# Patient Record
Sex: FEMALE | Race: WHITE
[De-identification: names, ages, dates, MRNs, and addresses within clinical notes are randomized per-mention and may not be internally consistent; named-entity substitution may affect disease eponyms.]

---

## 2017-07-10 ENCOUNTER — HOSPITAL ENCOUNTER (EMERGENCY)
Dept: HOSPITAL 72 - EMR | Age: 47
Discharge: HOME | End: 2017-07-10
Payer: COMMERCIAL

## 2017-07-10 VITALS — SYSTOLIC BLOOD PRESSURE: 125 MMHG | DIASTOLIC BLOOD PRESSURE: 54 MMHG

## 2017-07-10 VITALS — WEIGHT: 130 LBS | BODY MASS INDEX: 25.52 KG/M2 | HEIGHT: 60 IN

## 2017-07-10 VITALS — SYSTOLIC BLOOD PRESSURE: 128 MMHG | DIASTOLIC BLOOD PRESSURE: 59 MMHG

## 2017-07-10 VITALS — SYSTOLIC BLOOD PRESSURE: 140 MMHG | DIASTOLIC BLOOD PRESSURE: 70 MMHG

## 2017-07-10 DIAGNOSIS — N83.202: ICD-10-CM

## 2017-07-10 DIAGNOSIS — K42.9: ICD-10-CM

## 2017-07-10 DIAGNOSIS — R10.32: Primary | ICD-10-CM

## 2017-07-10 LAB
ALBUMIN/GLOB SERPL: 1.6 {RATIO} (ref 1–2.7)
ALT SERPL-CCNC: 17 U/L (ref 3–33)
ANION GAP SERPL CALC-SCNC: 15 MMOL/L (ref 5–15)
APPEARANCE UR: CLEAR
APTT BLD: 26 SEC (ref 23–33)
AST SERPL-CCNC: 21 U/L (ref 5–40)
BASOPHILS NFR BLD AUTO: 1.3 % (ref 0–2)
CALCIUM SERPL-MCNC: 10 MG/DL (ref 8.6–10.2)
CHLORIDE SERPL-SCNC: 99 MEQ/L (ref 98–107)
CO2 SERPL-SCNC: 22 MEQ/L (ref 20–30)
CREAT SERPL-MCNC: 1 MG/DL (ref 0.5–0.9)
EOSINOPHIL NFR BLD AUTO: 0.4 % (ref 0–3)
ERYTHROCYTE [DISTWIDTH] IN BLOOD BY AUTOMATED COUNT: 12.7 % (ref 11.6–14.8)
GFR SERPLBLD BASED ON 1.73 SQ M-ARVRAT: 58.9 ML/MIN (ref 60–?)
GLOBULIN SER-MCNC: 3 G/DL
HEMOLYSIS: 7
INR PPP: 0.9 (ref 0.9–1.1)
KETONES UR QL STRIP: (no result)
LEUKOCYTE ESTERASE UR QL STRIP: NEGATIVE
LIPASE SERPL-CCNC: 64 U/L (ref ?–60)
LYMPHOCYTES NFR BLD AUTO: 9.4 % (ref 20–45)
MCH RBC QN AUTO: 31.5 PG (ref 27–31)
MCHC RBC AUTO-ENTMCNC: 32 G/DL (ref 32–36)
MCV RBC AUTO: 98 FL (ref 80–99)
MONOCYTES NFR BLD AUTO: 4.3 % (ref 1–10)
NEUTROPHILS NFR BLD AUTO: 84.7 % (ref 45–75)
NITRITE UR QL STRIP: NEGATIVE
PH UR STRIP: 5 [PH] (ref 4.5–8)
PLATELET # BLD: 404 K/UL (ref 150–450)
PMV BLD AUTO: 5.5 FL (ref 6.5–10.1)
POTASSIUM SERPL-SCNC: 4.3 MEQ/L (ref 3.4–4.9)
PROT SERPL-MCNC: 7.9 G/DL (ref 6.6–8.7)
PROT UR QL STRIP: NEGATIVE
PROTHROMBIN TIME: 9.4 SEC (ref 9.3–11.5)
RBC # BLD AUTO: 4.81 M/UL (ref 4.2–5.4)
SODIUM SERPL-SCNC: 136 MEQ/L (ref 135–145)
SP GR UR STRIP: 1.02 (ref 1–1.03)
UROBILINOGEN UR-MCNC: NORMAL MG/DL (ref 0–1)
WBC # BLD AUTO: 9.1 K/UL (ref 4.8–10.8)

## 2017-07-10 PROCEDURE — 74177 CT ABD & PELVIS W/CONTRAST: CPT

## 2017-07-10 PROCEDURE — 83690 ASSAY OF LIPASE: CPT

## 2017-07-10 PROCEDURE — 86850 RBC ANTIBODY SCREEN: CPT

## 2017-07-10 PROCEDURE — 96360 HYDRATION IV INFUSION INIT: CPT

## 2017-07-10 PROCEDURE — 85730 THROMBOPLASTIN TIME PARTIAL: CPT

## 2017-07-10 PROCEDURE — 86901 BLOOD TYPING SEROLOGIC RH(D): CPT

## 2017-07-10 PROCEDURE — 85610 PROTHROMBIN TIME: CPT

## 2017-07-10 PROCEDURE — 96374 THER/PROPH/DIAG INJ IV PUSH: CPT

## 2017-07-10 PROCEDURE — 86900 BLOOD TYPING SEROLOGIC ABO: CPT

## 2017-07-10 PROCEDURE — 99284 EMERGENCY DEPT VISIT MOD MDM: CPT

## 2017-07-10 PROCEDURE — 85025 COMPLETE CBC W/AUTO DIFF WBC: CPT

## 2017-07-10 PROCEDURE — 36415 COLL VENOUS BLD VENIPUNCTURE: CPT

## 2017-07-10 PROCEDURE — 80053 COMPREHEN METABOLIC PANEL: CPT

## 2017-07-10 PROCEDURE — 96375 TX/PRO/DX INJ NEW DRUG ADDON: CPT

## 2017-07-10 PROCEDURE — 81003 URINALYSIS AUTO W/O SCOPE: CPT

## 2017-07-10 NOTE — EMERGENCY ROOM REPORT
History of Present Illness


General


Chief Complaint:  Abdominal Pain


Source:  Patient





Present Illness


HPI


Patient presents with severe LLQ pain began past midnight this AM.  Constant 

and somewhat crampy.  Somewhat diffuse pain. Has had diverticulitis and feels 

somewhat like that.  Feels chilled but not documented fever.  Pain now 9-10/10, 

constant.  Vomited thick bile - no blood.  No diarrhea/melena.  Moved bowels 

normally earlier.  No dysuria.  Somewhat anxious.  Has new job, but going well.





No rashes, HA, dizziness, extremity pain.  Post menopausal.


Allergies:  


Coded Allergies:  


     No Known Allergies (Unverified , 7/10/17)





Patient History


Past Medical History:  see triage record


Social History:  Denies: alcohol use, smoking - former


Social History Narrative


 for non-profit


Last Menstrual Period:  May 2014





Nursing Documentation-[facility]


Past Medical History:  No History, Except For


History Of Psychiatric Problem:  Yes - Depression, PTSD





Review of Systems


All Other Systems:  negative except mentioned in HPI





Physical Exam





Vital Signs








  Date Time  Temp Pulse Resp B/P Pulse Ox O2 Delivery O2 Flow Rate FiO2


 


7/10/17 08:53 97.9 109 16 128/70 97 Room Air  








Sp02 EP Interpretation:  reviewed, normal


General Appearance:  well appearing, no apparent distress, GCS 15


Head:  normocephalic


Eyes:  bilateral eye PERRL, bilateral eye normal inspection


ENT:  moist mucus membranes


Neck:  supple


Respiratory:  lungs clear, normal breath sounds


Cardiovascular #1:  regular rate, rhythm


Cardiovascular #2:  2+ radial (R)


Gastrointestinal:  normal inspection, no mass, non-distended, no rebound, 

guarding, tenderness - LLQ


Musculoskeletal:  back normal, gait/station normal, normal range of motion


Neurologic:  alert, oriented x3, grossly normal


Psychiatric:  mood/affect normal - sl anxious


Skin:  normal inspection, warm/dry





Medical Decision Making


Diagnostic Impression:  


 Primary Impression:  


 Abdominal pain


 Qualified Codes:  R10.32 - Left lower quadrant pain


 Additional Impression:  


 Left ovarian cyst


ER Course


Patient presents with severe LLQ pain with vomiting.  Ddx: diverticulitis, food 

poisoning, gastroenteritis, SBO, UTI, pyelo amongst others.  Significant pain 

and exam, though not surgical.  Need eval with labs and CT.  IV hydration and 

analgesia.  





Initial dose of morphine not control pain.  Dilaudid ordered.





Labs wit normal WBC.  Rest unremarkable.





CT as below.  Not surgical, though question of L ovarian cyst.  Query gall 

bladder - not area of tenderness.





Improved.  Repeat exam, no guard.  Decreased pain.





Discussed observation at home with close follow up (tomorrow) with PMD.  (I 

offered admission for observation, though I do not think necessary.)





Patient stable for outpatient observation and treatment.





Laboratory Tests








Test


  7/10/17


08:59 7/10/17


09:15


 


Urine Color Pale yellow   


 


Urine Appearance Clear   


 


Urine pH 5 (4.5-8.0)   


 


Urine Specific Gravity


  1.025


(1.005-1.035) 


 


 


Urine Protein


  Negative


(NEGATIVE) 


 


 


Urine Glucose (UA)


  Negative


(NEGATIVE) 


 


 


Urine Ketones


  1+ (NEGATIVE)


H 


 


 


Urine Occult Blood


  Negative


(NEGATIVE) 


 


 


Urine Nitrite


  Negative


(NEGATIVE) 


 


 


Urine Bilirubin


  Negative


(NEGATIVE) 


 


 


Urine Urobilinogen


  Normal MG/DL


(0.0-1.0) 


 


 


Urine Leukocyte Esterase


  Negative


(NEGATIVE) 


 


 


White Blood Count


  


  9.1 K/UL


(4.8-10.8)


 


Red Blood Count


  


  4.81 M/UL


(4.20-5.40)


 


Hemoglobin


  


  15.1 G/DL


(12.0-16.0)


 


Hematocrit


  


  47.4 %


(37.0-47.0)  H


 


Mean Corpuscular Volume  98 FL (80-99)  


 


Mean Corpuscular Hemoglobin


  


  31.5 PG


(27.0-31.0)  H


 


Mean Corpuscular Hemoglobin


Concent 


  32.0 G/DL


(32.0-36.0)


 


Red Cell Distribution Width


  


  12.7 %


(11.6-14.8)


 


Platelet Count


  


  404 K/UL


(150-450)


 


Mean Platelet Volume


  


  5.5 FL


(6.5-10.1)  L


 


Neutrophils (%) (Auto)


  


  84.7 %


(45.0-75.0)  H


 


Lymphocytes (%) (Auto)


  


  9.4 %


(20.0-45.0)  L


 


Monocytes (%) (Auto)


  


  4.3 %


(1.0-10.0)


 


Eosinophils (%) (Auto)


  


  0.4 %


(0.0-3.0)


 


Basophils (%) (Auto)


  


  1.3 %


(0.0-2.0)


 


Prothrombin Time


  


  9.4 SEC


(9.30-11.50)


 


Prothrombin Time INR  0.9 (0.9-1.1)  


 


PTT


  


  26 SEC (23-33)


 


 


Sodium Level


  


  136 mEQ/L


(135-145)


 


Potassium Level


  


  4.3 mEQ/L


(3.4-4.9)


 


Chloride Level


  


  99 mEQ/L


()


 


Carbon Dioxide Level


  


  22 mEQ/L


(20-30)


 


Anion Gap  15 (5-15)  


 


Blood Urea Nitrogen


  


  8 mg/dL (7-23)


 


 


Creatinine


  


  1.0 mg/dL


(0.5-0.9)  H


 


Estimate Glomerular


Filtration Rate 


  58.9 mL/min


(>60)


 


Glucose Level


  


  99 mg/dL


()


 


Calcium Level


  


  10.0 mg/dL


(8.6-10.2)


 


Total Bilirubin


  


  0.2 mg/dL


(0.0-1.2)


 


Aspartate Amino Transferase


(AST) 


  21 U/L (5-40)  


 


 


Alanine Aminotransferase (ALT)  17 U/L (3-33)  


 


Alkaline Phosphatase


  


  79 U/L


()


 


Total Protein


  


  7.9 g/dL


(6.6-8.7)


 


Albumin


  


  4.9 g/dL


(3.5-5.2)


 


Globulin  3.0 g/dL  


 


Albumin/Globulin Ratio  1.6 (1.0-2.7)  


 


Lipase


  


  64 U/L (< 60)


H








CT/MRI/US Diagnostic Results


CT/MRI/US Diagnostic Results :  


   Imaging Test Ordered:  ct abd and pelvis


   Impression


Impression: 


Cholelithiasis. Equivocal mild gallbladder wall thickening but no


evidence of pericholecystic inflammation. Consider ultrasound and/or nuclear


medicine hepatobiliary scan for further evaluation if there is high clinical


suspicion for acute cholecystitis. Negative for dilated ducts





5.8 cm left adnexal cyst, probably a cyst of ovarian origin. Recommend further


evaluation with pelvic and endovaginal sonography as well as gynecological 

evaluation.





Incidental finding of small fat-containing umbilical hernia





Last Vital Signs








  Date Time  Temp Pulse Resp B/P Pulse Ox O2 Delivery O2 Flow Rate FiO2


 


7/10/17 13:40 97.8 87 16 125/54 95 Room Air  








Status:  improved


Disposition:  HOME, SELF-CARE


Condition:  Improved


Scripts


Ondansetron Odt* (ZOFRAN ODT*) 4 Mg Tab.rapdis


4 MG ORAL Q8H Y for Nausea & Vomiting, #6 TAB 1 Refill


   Prov: Miller Pineda M.D.         7/10/17 


Hydrocodone Bit/Acetaminophen 5-325* (NORCO 5-325*) 1 Each Tablet


1 TAB ORAL Q6H Y for For Pain, #10 TAB 0 Refills


   Prov: Miller Pineda M.D.         7/10/17


Referrals:  


[facility],REFERRING (PCP)











Miller Pineda M.D. Jul 10, 2017 13:18

## 2017-07-10 NOTE — DIAGNOSTIC IMAGING REPORT
Clinical Indication: ABD PAIN abdominal pain and vomiting x1 day



Technique:   Patient given oral contrast.  IV administration nonionic contrast.

Venous phase spiral acquisition obtained through the abdomen and pelvis. 

Multiplanar

reconstructions were generated. Total dose length product a 62 mGycm. CTDIvol(s) 17

mGy. Dose reduction achieved using automated exposure control





Comparison: None



Findings: No evidence of diverticulosis or diverticulitis. Normal appendix. No 

small

bowel distention or small bowel wall thickening. No free or loculated

intraperitoneal air or fluid. Tiny fat-containing umbilical hernia noted. 

Distal

esophagus, stomach, duodenum are unremarkable.



The gallbladder is filled with gallstones. There is equivocal mild gallbladder 

wall

thickening but no evidence of infiltration of the pericholecystic fat. The bile

ducts are normal in caliber. The liver, pancreas, spleen, adrenals are 

unremarkable.

Kidneys, collecting systems, ureters, bladder are unremarkable.



In the left adnexal region, there is a large thin-walled unilocular cyst which

measures 5.8 cm long axis dimension. Uterus, right ovary are unremarkable.



The included lung bases are clear. The bones are unremarkable.



Impression: Cholelithiasis. Equivocal mild gallbladder wall thickening but no

evidence of pericholecystic inflammation. Consider ultrasound and/or nuclear

medicine hepatobiliary scan for further evaluation if there is high clinical

suspicion for acute cholecystitis. Negative for dilated ducts



5.8 cm left adnexal cyst, probably a cyst of ovarian origin. Recommend further

evaluation with pelvic and endovaginal sonography as well as gynecological

evaluation.



Incidental finding of small fat-containing umbilical hernia











The CT scanner at [facility] is accredited by the American College 

of

Radiology and the scans are performed using protocols designed to limit 

radiation

exposure to as low as reasonably achievable to attain images of sufficient

resolution adequate for diagnostic evaluation.

## 2018-05-29 ENCOUNTER — HOSPITAL ENCOUNTER (EMERGENCY)
Dept: HOSPITAL 72 - EMR | Age: 48
Discharge: HOME | End: 2018-05-29
Payer: MEDICAID

## 2018-05-29 VITALS — DIASTOLIC BLOOD PRESSURE: 60 MMHG | SYSTOLIC BLOOD PRESSURE: 110 MMHG

## 2018-05-29 VITALS — BODY MASS INDEX: 25.52 KG/M2 | HEIGHT: 60 IN | WEIGHT: 130 LBS

## 2018-05-29 VITALS — SYSTOLIC BLOOD PRESSURE: 110 MMHG | DIASTOLIC BLOOD PRESSURE: 60 MMHG

## 2018-05-29 VITALS — DIASTOLIC BLOOD PRESSURE: 65 MMHG | SYSTOLIC BLOOD PRESSURE: 119 MMHG

## 2018-05-29 DIAGNOSIS — K80.20: ICD-10-CM

## 2018-05-29 DIAGNOSIS — N83.8: Primary | ICD-10-CM

## 2018-05-29 LAB
ADD MANUAL DIFF: NO
ALBUMIN SERPL-MCNC: 4.3 G/DL (ref 3.4–5)
ALBUMIN/GLOB SERPL: 1 {RATIO} (ref 1–2.7)
ALP SERPL-CCNC: 86 U/L (ref 46–116)
ALT SERPL-CCNC: 27 U/L (ref 12–78)
ANION GAP SERPL CALC-SCNC: 10 MMOL/L (ref 5–15)
APPEARANCE UR: CLEAR
APTT PPP: (no result) S
AST SERPL-CCNC: 19 U/L (ref 15–37)
BASOPHILS NFR BLD AUTO: 1.4 % (ref 0–2)
BILIRUB SERPL-MCNC: 0.2 MG/DL (ref 0.2–1)
BUN SERPL-MCNC: 8 MG/DL (ref 7–18)
CALCIUM SERPL-MCNC: 9.3 MG/DL (ref 8.5–10.1)
CHLORIDE SERPL-SCNC: 97 MMOL/L (ref 98–107)
CO2 SERPL-SCNC: 28 MMOL/L (ref 21–32)
CREAT SERPL-MCNC: 1.1 MG/DL (ref 0.55–1.3)
EOSINOPHIL NFR BLD AUTO: 5.1 % (ref 0–3)
ERYTHROCYTE [DISTWIDTH] IN BLOOD BY AUTOMATED COUNT: 12.1 % (ref 11.6–14.8)
GLOBULIN SER-MCNC: 4.1 G/DL
GLUCOSE UR STRIP-MCNC: NEGATIVE MG/DL
HCT VFR BLD CALC: 44.8 % (ref 37–47)
HGB BLD-MCNC: 14.8 G/DL (ref 12–16)
KETONES UR QL STRIP: NEGATIVE
LEUKOCYTE ESTERASE UR QL STRIP: NEGATIVE
LYMPHOCYTES NFR BLD AUTO: 36.9 % (ref 20–45)
MCV RBC AUTO: 93 FL (ref 80–99)
MONOCYTES NFR BLD AUTO: 6.5 % (ref 1–10)
NEUTROPHILS NFR BLD AUTO: 50.2 % (ref 45–75)
NITRITE UR QL STRIP: NEGATIVE
PH UR STRIP: 6 [PH] (ref 4.5–8)
PLATELET # BLD: 366 K/UL (ref 150–450)
POTASSIUM SERPL-SCNC: 3.3 MMOL/L (ref 3.5–5.1)
PROT UR QL STRIP: NEGATIVE
RBC # BLD AUTO: 4.84 M/UL (ref 4.2–5.4)
SODIUM SERPL-SCNC: 135 MMOL/L (ref 136–145)
SP GR UR STRIP: 1.01 (ref 1–1.03)
UROBILINOGEN UR-MCNC: NORMAL MG/DL (ref 0–1)
WBC # BLD AUTO: 5.8 K/UL (ref 4.8–10.8)

## 2018-05-29 PROCEDURE — 96375 TX/PRO/DX INJ NEW DRUG ADDON: CPT

## 2018-05-29 PROCEDURE — 81003 URINALYSIS AUTO W/O SCOPE: CPT

## 2018-05-29 PROCEDURE — 76830 TRANSVAGINAL US NON-OB: CPT

## 2018-05-29 PROCEDURE — 96374 THER/PROPH/DIAG INJ IV PUSH: CPT

## 2018-05-29 PROCEDURE — 83690 ASSAY OF LIPASE: CPT

## 2018-05-29 PROCEDURE — 76856 US EXAM PELVIC COMPLETE: CPT

## 2018-05-29 PROCEDURE — 96361 HYDRATE IV INFUSION ADD-ON: CPT

## 2018-05-29 PROCEDURE — 99284 EMERGENCY DEPT VISIT MOD MDM: CPT

## 2018-05-29 PROCEDURE — 96360 HYDRATION IV INFUSION INIT: CPT

## 2018-05-29 PROCEDURE — 36415 COLL VENOUS BLD VENIPUNCTURE: CPT

## 2018-05-29 PROCEDURE — 85025 COMPLETE CBC W/AUTO DIFF WBC: CPT

## 2018-05-29 PROCEDURE — 80053 COMPREHEN METABOLIC PANEL: CPT

## 2018-05-29 PROCEDURE — 74177 CT ABD & PELVIS W/CONTRAST: CPT

## 2018-05-29 NOTE — DIAGNOSTIC IMAGING REPORT
Indication:Lower abdominal and pelvic pain. 48-year-old female

 

Technique: Grayscale and duplex Doppler imaging of the pelvis performed utilizing a

transabdominal scan and endovaginal scan.

 

Comparison: None

 

Findings:

 

Correlation made with concurrent CT.

 

There is an ovoid cystic mass within the left adnexa measuring 8.1 x 4.0 cm on this

examination. There are low-level internal echoes and septation within the mass which

is complex. Consider ovarian tumor. Evaluation by GYN is needed.

 

Separate left ovary is not identified. The right ovary is seen and appears normal

measuring 1.8 x 2 x 0.9 cm.

 

The endometrium is thickened and there is increased echogenicity of the innermost

lining of the endometrium. Endometrial thickness is about 15 mm. The uterus measures

5.2 x 3.2 x 2 cm. There is no free fluid.

 

IMPRESSION:

 

Mildly complex left adnexal cystic mass measuring 8 cm. Ovarian neoplasm is a

consideration such as a cystadenoma. Clinical evaluation by GYN is needed.

 

Stigmata of previous D&C with echogenicity of the endometrium.

## 2018-05-29 NOTE — EMERGENCY ROOM REPORT
History of Present Illness


General


Chief Complaint:  Abdominal Pain


Source:  Patient





Present Illness


HPI


48-year-old female presents ED complaining of abdominal pain started a few days 

ago.  Pain is diffuse, 7 out of 10, sharp, radiating from epigastric to lower 

abdomen.  History of gastritis.  States that pain feels like when she had 

ruptured ovarian cyst last time.  Notes nausea and vomiting.  Denies any 

vaginal bleeding or discharge.  Denies chest pain or shortness of breath.  No 

other aggravating relieving factors.  Denies any other associated symptoms


Allergies:  


Coded Allergies:  


     No Known Allergies (Unverified , 7/10/17)





Patient History


Past Medical History:  none


Past Surgical History:  none


Pertinent Family History:  none


Social History:  Denies: smoking, alcohol use, drug use


Last Menstrual Period:  2014


Pregnant Now:  No


Immunizations:  UTD


Reviewed Nursing Documentation:  PMH: Agreed; PSxH: Agreed





Review of Systems


All Other Systems:  negative except mentioned in HPI





Physical Exam





Vital Signs








  Date Time  Temp Pulse Resp B/P (MAP) Pulse Ox O2 Delivery O2 Flow Rate FiO2


 


5/29/18 09:09 97.9 89 19 126/66 94 Room Air  





 97.9       








Sp02 EP Interpretation:  reviewed, normal


General Appearance:  alert, GCS 15, non-toxic, mild distress


Head:  normocephalic, atraumatic


Eyes:  bilateral eye normal inspection, bilateral eye PERRL


ENT:  hearing grossly normal, normal pharynx, no angioedema, normal voice


Neck:  full range of motion, supple/symm/no masses


Respiratory:  chest non-tender, lungs clear, normal breath sounds, speaking 

full sentences


Cardiovascular #1:  regular rate, rhythm, no edema


Cardiovascular #2:  2+ carotid (R), 2+ carotid (L), 2+ radial (R), 2+ radial (L)

, 2+ dorsalis pedis (R), 2+ dorsalis pedis (L)


Gastrointestinal:  normal bowel sounds, soft, non-distended, no rebound, 

tenderness


Rectal:  deferred


Genitourinary:  normal inspection, no CVA tenderness


Musculoskeletal:  back normal, gait/station normal, normal range of motion, non-

tender


Neurologic:  alert, oriented x3, responsive, motor strength/tone normal, 

sensory intact, speech normal


Psychiatric:  judgement/insight normal, memory normal, mood/affect normal, no 

suicidal/homicidal ideation


Reflexes:  3+ bicep (R), 3+ bicep (L), 3+ tricep (R), 3+ tricep (L), 3+ knee (R)

, 3+ knee (L)


Skin:  normal color, no rash, warm/dry, well hydrated


Lymphatic:  no adenopathy





Medical Decision Making


Diagnostic Impression:  


 Primary Impression:  


 Ovarian mass, left


ER Course


Hospital Course 


48-year-old F presents to ED with abdominal pain





Differential diagnosis includes-appendicitis, cholecystitis, small bowel 

obstruction, gastritis, 





Clinical course


Patient placed on stretcher.  After initial history and physical I ordered labs

, IV fluids, pain medications, CT and US





Labs - no leukocytosis, electrolytes ok, LFTs normal


CT scan shows some gallstones and thickening of gallbladder.  Large left ovary 

with septation


Pelvic ultrasound shows enlarged left ovary.  Questionable mass





Reviewed EMR.  Patient was seen here in July 2017.  Patient had CT which also 

documented the cholelithiasis and appears unchanged.  LFTs unremarkable





I discussed findings with the patient.  Patient states she had an ultrasound 

done last year at another hospital which showed a "cyst".  Recommend follow-up 

with the OB/GYN.  I will provide referrals





I feel this is a highly complex case requiring extensive working including EKG/

Rhythm strip, Xray/CT/US, Blood/urine lab work, repeat exams while in ED, and 

administration of strong opiates/narcotics for pain control, admission to 

hospital or close patient follow up.  





Diagnosis - left ovarian mass





Stable and discharged to home with Rx [place], zofran.  Followup with PMD/OBGYN.  

Return to ED if symptoms recur or worsen





Labs








Test


  5/29/18


09:26


 


White Blood Count


  5.8 K/UL


(4.8-10.8)


 


Red Blood Count


  4.84 M/UL


(4.20-5.40)


 


Hemoglobin


  14.8 G/DL


(12.0-16.0)


 


Hematocrit


  44.8 %


(37.0-47.0)


 


Mean Corpuscular Volume 93 FL (80-99) 


 


Mean Corpuscular Hemoglobin


  30.5 PG


(27.0-31.0)


 


Mean Corpuscular Hemoglobin


Concent 33.0 G/DL


(32.0-36.0)


 


Red Cell Distribution Width


  12.1 %


(11.6-14.8)


 


Platelet Count


  366 K/UL


(150-450)


 


Mean Platelet Volume


  5.1 FL


(6.5-10.1)


 


Neutrophils (%) (Auto)


  50.2 %


(45.0-75.0)


 


Lymphocytes (%) (Auto)


  36.9 %


(20.0-45.0)


 


Monocytes (%) (Auto)


  6.5 %


(1.0-10.0)


 


Eosinophils (%) (Auto)


  5.1 %


(0.0-3.0)


 


Basophils (%) (Auto)


  1.4 %


(0.0-2.0)


 


Sodium Level


  135 MMOL/L


(136-145)


 


Potassium Level


  3.3 MMOL/L


(3.5-5.1)


 


Chloride Level


  97 MMOL/L


()


 


Carbon Dioxide Level


  28 MMOL/L


(21-32)


 


Anion Gap


  10 mmol/L


(5-15)


 


Blood Urea Nitrogen 8 mg/dL (7-18) 


 


Creatinine


  1.1 MG/DL


(0.55-1.30)


 


Estimat Glomerular Filtration


Rate 52.6 mL/min


(>60)


 


Glucose Level


  89 MG/DL


()


 


Calcium Level


  9.3 MG/DL


(8.5-10.1)


 


Total Bilirubin


  0.2 MG/DL


(0.2-1.0)


 


Aspartate Amino Transf


(AST/SGOT) 19 U/L (15-37) 


 


 


Alanine Aminotransferase


(ALT/SGPT) 27 U/L (12-78) 


 


 


Alkaline Phosphatase


  86 U/L


()


 


Total Protein


  8.4 G/DL


(6.4-8.2)


 


Albumin


  4.3 G/DL


(3.4-5.0)


 


Globulin 4.1 g/dL 


 


Albumin/Globulin Ratio 1.0 (1.0-2.7) 


 


Lipase


  285 U/L


()








CT/MRI/US Diagnostic Results


CT/MRI/US Diagnostic Results #1:  


   Imaging Test Ordered:  CT A/P


   Impression


Cholelithiasis with some enhancement of the gallbladder wall. Cholecystitis not


excluded. Correlate clinically.


 


6.6 x 3.7 x 5.0 cm ovoid cystic mass in the left adnexa. The mass has not 

changed


significantly in size compared to 7/10/2017. The lesion is complex with internal


debris and septations noted on ultrasound


CT/MRI/US Diagnostic Results #2:  


   Imaging Test Ordered:  Pelvic US


   Impression


Mildly complex left adnexal cystic mass measuring 8 cm. Ovarian neoplasm is a


consideration such as a cystadenoma. Clinical evaluation by GYN is needed.





Last Vital Signs








  Date Time  Temp Pulse Resp B/P (MAP) Pulse Ox O2 Delivery O2 Flow Rate FiO2


 


5/29/18 09:35 97.9       


 


5/29/18 09:09  89 19 126/66 94 Room Air  








Status:  improved


Disposition:  HOME, SELF-CARE


Condition:  Stable


Scripts


Ondansetron Odt* (ZOFRAN ODT*) 4 Mg Tab.rapdis


4 MG BC EVERY 6 HOURS PRN for Nausea & Vomiting, #30 TAB 0 Refills


   Prov: Alexandr Tovar MD         5/29/18 


Hydrocodone Bit/Acetaminophen 5-325* (NORCO 5-325*) 1 Each Tablet


1 TAB ORAL Q6H PRN for For Pain, #10 TAB 0 Refills


   Prov: Alexandr Tovar MD         5/29/18


Referrals:  


[facility],REFERRING (PCP)











Alexandr Tovar MD May 29, 2018 10:13

## 2018-05-29 NOTE — DIAGNOSTIC IMAGING REPORT
Indication: Abdominal pain

 

Technique: Continuous helical transaxial imaging of the abdomen and pelvis was

obtained from the lung bases to the pubic symphysis during intravenous contrast

administration. Coronal 2-D reformats were also obtained. Study obtained in a Siemens

sensation 64 slice CT.  Automatic Exposure Control was utilized.

 

Total Dose length Product (DLP):  722.03 mGycm

 

CT Dose Index Volume (CTDIvol):   14.79 mGy

 

Comparison: 7/10/2017

 

Findings: There are multiple gallstones present. There is some thickening of the wall

the gallbladder and inflammation may be present. Correlate for cholecystitis.

 

The pancreas, liver, spleen, adrenal glands and kidneys appear normal. There is no

free fluid identified. Appendix is not definitely seen. Aorta is mildly calcified.

 

There is an ovoid cystic mass in the left adnexa measuring approximately 6.6 x 3.7

cm. Similar finding noted on the prior occasion. The mass does not appear

significantly changed in size and may be ovarian in origin. Sonographic evaluation

demonstrated a cystic mass with internal echoes and septation. The patient is

postmenopausal. Consider surgical excision possibly after clinical consideration and

evaluation by GYN. The uterus is unremarkable.

 

IMPRESSION:

 

Cholelithiasis with some enhancement of the gallbladder wall. Cholecystitis not

excluded. Correlate clinically.

 

6.6 x 3.7 x 5.0 cm ovoid cystic mass in the left adnexa. The mass has not changed

significantly in size compared to 7/10/2017. The lesion is complex with internal

debris and septations noted on ultrasound. Clinical evaluation by GYN is needed.

 

Mild atherosclerotic disease.

 

No secondary signs of acute appendicitis. Appendix not seen.

 

 

 

 

 

 

 

 

 

 

 

The CT scanner at [facility] is accredited by the American College of

Radiology and the scans are performed using dose optimization techniques as

appropriate to a performed exam including Automatic Exposure control.